# Patient Record
Sex: MALE | Race: WHITE | NOT HISPANIC OR LATINO | Employment: OTHER | URBAN - NONMETROPOLITAN AREA
[De-identification: names, ages, dates, MRNs, and addresses within clinical notes are randomized per-mention and may not be internally consistent; named-entity substitution may affect disease eponyms.]

---

## 2017-02-28 ENCOUNTER — OFFICE VISIT (OUTPATIENT)
Dept: OTOLARYNGOLOGY | Facility: CLINIC | Age: 67
End: 2017-02-28

## 2017-02-28 VITALS — WEIGHT: 166 LBS | BODY MASS INDEX: 22.48 KG/M2 | HEIGHT: 72 IN | TEMPERATURE: 97.2 F

## 2017-02-28 DIAGNOSIS — H60.63 CHRONIC NON-INFECTIVE OTITIS EXTERNA OF BOTH EARS, UNSPECIFIED TYPE: Primary | ICD-10-CM

## 2017-02-28 PROCEDURE — 99212 OFFICE O/P EST SF 10 MIN: CPT | Performed by: OTOLARYNGOLOGY

## 2017-02-28 NOTE — PROGRESS NOTES
Subjective   Kane Baldwin is a 66 y.o. male.       History of Present Illness   Patient has a significantly dilated left ear canal that causes him to collect wax and squamous debris.  He is also very sensitive with a pronounced cough reflex when his ears were cleaned.  He returns for reevaluation stating he can tell he has something in his ear particularly on the left.      The following portions of the patient's history were reviewed and updated as appropriate: allergies, current medications, past family history, past medical history, past social history, past surgical history and problem list.     reports that he has quit smoking. He has quit using smokeless tobacco. His smokeless tobacco use included Chew.   Patient is not a tobacco user and has not been counseled for use of tobacco products      Review of Systems        Objective   Physical Exam  Only modest squamous debris in the right ear.  Left ear shows significant dilation the ear canal and a good bit of squamous debris that cleaned under the microscope using cerumen loops and suction.  Tympanic membrane is intact.      Assessment/Plan   Kane was seen today for follow-up.    Diagnoses and all orders for this visit:    Chronic non-infective otitis externa of both ears, unspecified type      Plan: Left ear cleaned as described above.  Return in 3 months.  Call for problems.

## 2017-03-28 ENCOUNTER — HOSPITAL ENCOUNTER (OUTPATIENT)
Dept: GENERAL RADIOLOGY | Age: 67
Discharge: HOME | End: 2017-03-28
Payer: MEDICARE

## 2017-03-28 ENCOUNTER — OFFICE VISIT (OUTPATIENT)
Dept: UROLOGY | Age: 67
End: 2017-03-28
Payer: MEDICARE

## 2017-03-28 VITALS
TEMPERATURE: 98.2 F | BODY MASS INDEX: 22.48 KG/M2 | WEIGHT: 166 LBS | DIASTOLIC BLOOD PRESSURE: 70 MMHG | HEIGHT: 72 IN | SYSTOLIC BLOOD PRESSURE: 112 MMHG | HEART RATE: 76 BPM | OXYGEN SATURATION: 91 %

## 2017-03-28 DIAGNOSIS — N20.0 KIDNEY STONE: ICD-10-CM

## 2017-03-28 DIAGNOSIS — N20.1 RIGHT URETERAL STONE: Primary | ICD-10-CM

## 2017-03-28 DIAGNOSIS — N20.1 RIGHT URETERAL STONE: ICD-10-CM

## 2017-03-28 PROCEDURE — 4040F PNEUMOC VAC/ADMIN/RCVD: CPT | Performed by: PHYSICIAN ASSISTANT

## 2017-03-28 PROCEDURE — G8419 CALC BMI OUT NRM PARAM NOF/U: HCPCS | Performed by: PHYSICIAN ASSISTANT

## 2017-03-28 PROCEDURE — 1036F TOBACCO NON-USER: CPT | Performed by: PHYSICIAN ASSISTANT

## 2017-03-28 PROCEDURE — 1123F ACP DISCUSS/DSCN MKR DOCD: CPT | Performed by: PHYSICIAN ASSISTANT

## 2017-03-28 PROCEDURE — 3017F COLORECTAL CA SCREEN DOC REV: CPT | Performed by: PHYSICIAN ASSISTANT

## 2017-03-28 PROCEDURE — G8427 DOCREV CUR MEDS BY ELIG CLIN: HCPCS | Performed by: PHYSICIAN ASSISTANT

## 2017-03-28 PROCEDURE — 99213 OFFICE O/P EST LOW 20 MIN: CPT | Performed by: PHYSICIAN ASSISTANT

## 2017-03-28 PROCEDURE — G8484 FLU IMMUNIZE NO ADMIN: HCPCS | Performed by: PHYSICIAN ASSISTANT

## 2017-03-28 PROCEDURE — 74000 XR ABDOMEN LIMITED (KUB): CPT

## 2017-03-28 ASSESSMENT — ENCOUNTER SYMPTOMS
COUGH: 0
BLURRED VISION: 0
HEMOPTYSIS: 0
HEARTBURN: 0
DOUBLE VISION: 0
NAUSEA: 0

## 2017-05-30 ENCOUNTER — OFFICE VISIT (OUTPATIENT)
Dept: OTOLARYNGOLOGY | Facility: CLINIC | Age: 67
End: 2017-05-30

## 2017-05-30 VITALS — HEIGHT: 72 IN | WEIGHT: 161 LBS | BODY MASS INDEX: 21.81 KG/M2 | TEMPERATURE: 96 F

## 2017-05-30 DIAGNOSIS — H61.22 IMPACTED CERUMEN OF LEFT EAR: Primary | ICD-10-CM

## 2017-05-30 PROCEDURE — 69210 REMOVE IMPACTED EAR WAX UNI: CPT | Performed by: OTOLARYNGOLOGY

## 2017-08-31 ENCOUNTER — OFFICE VISIT (OUTPATIENT)
Dept: OTOLARYNGOLOGY | Facility: CLINIC | Age: 67
End: 2017-08-31

## 2017-08-31 VITALS — BODY MASS INDEX: 21.4 KG/M2 | OXYGEN SATURATION: 96 % | WEIGHT: 158 LBS | HEIGHT: 72 IN

## 2017-08-31 DIAGNOSIS — H61.22 IMPACTED CERUMEN OF LEFT EAR: Primary | ICD-10-CM

## 2017-08-31 PROCEDURE — 69210 REMOVE IMPACTED EAR WAX UNI: CPT | Performed by: OTOLARYNGOLOGY

## 2017-11-30 ENCOUNTER — OFFICE VISIT (OUTPATIENT)
Dept: OTOLARYNGOLOGY | Facility: CLINIC | Age: 67
End: 2017-11-30

## 2017-11-30 VITALS — OXYGEN SATURATION: 98 % | BODY MASS INDEX: 21.94 KG/M2 | HEART RATE: 61 BPM | WEIGHT: 162 LBS | HEIGHT: 72 IN

## 2017-11-30 DIAGNOSIS — H61.22 IMPACTED CERUMEN OF LEFT EAR: Primary | ICD-10-CM

## 2017-11-30 PROCEDURE — 69210 REMOVE IMPACTED EAR WAX UNI: CPT | Performed by: OTOLARYNGOLOGY

## 2017-11-30 NOTE — PROGRESS NOTES
Subjective   Kane Baldwin is a 67 y.o. male.       History of Present Illness   Patient has a dilated left ear canal and is prone to cerumen impactions.  Has a cough reflex that is severe when his ear is being manipulated.  In the past this has required cleaning of his ear under general anesthesia however lately he's been able to tolerate office debridement.  States his ear feels stopped up again.      The following portions of the patient's history were reviewed and updated as appropriate: allergies, current medications, past family history, past medical history, past social history, past surgical history and problem list.     reports that he has quit smoking. His smokeless tobacco use includes Chew.   Patient is a tobacco user and has been counseled for use of tobacco products      Review of Systems        Objective   Physical Exam  Right ear canal shows minimal squamous debris.  Tympanic membrane intact and clear.  Left ear canal is completely filled with cerumen and dried blood  Using the binocular microscope for visualization, cerumen impaction was removed from left ear canal(s) using instrumentation. This was personally performed by Hardeep Hnedrix MD  After cerumen removal tympanic membrane is noted be intact with no infection or effusion    Assessment/Plan   Kane was seen today for follow-up.    Diagnoses and all orders for this visit:    Impacted cerumen of left ear        Plan: Cerumen removed as described above.  Return in 3 months.

## 2018-03-01 ENCOUNTER — OFFICE VISIT (OUTPATIENT)
Dept: OTOLARYNGOLOGY | Facility: CLINIC | Age: 68
End: 2018-03-01

## 2018-03-01 VITALS — HEART RATE: 70 BPM | HEIGHT: 72 IN | WEIGHT: 169.6 LBS | BODY MASS INDEX: 22.97 KG/M2 | OXYGEN SATURATION: 98 %

## 2018-03-01 DIAGNOSIS — H60.63 CHRONIC NON-INFECTIVE OTITIS EXTERNA OF BOTH EARS, UNSPECIFIED TYPE: Primary | ICD-10-CM

## 2018-03-01 PROCEDURE — 99213 OFFICE O/P EST LOW 20 MIN: CPT | Performed by: OTOLARYNGOLOGY

## 2018-03-01 NOTE — PROGRESS NOTES
Subjective   Kane Baldwin is a 67 y.o. male.       History of Present Illness   Patient has recurring cerumen impactions and chronic otitis externa.  Has an extremely sensitive cough reflex with ear cleaning and has dilation of the left ear canal and is previously required cleaning of his ears under general anesthesia.  Returns today for reevaluation stating he thinks his ears do need cleaning      The following portions of the patient's history were reviewed and updated as appropriate: allergies, current medications, past family history, past medical history, past social history, past surgical history and problem list.     reports that he has quit smoking. His smokeless tobacco use includes Chew.   Patient is a tobacco user and has been counseled for use of tobacco products      Review of Systems   Constitutional: Negative for fever.           Objective   Physical Exam ears: External ears no deformity.  Right ear canal shows noninfected squamous debris that cleaned under the microscope.  Tympanic membrane intact and clear.  Left ear canal shows dilated ear canal with a large amount of squamous debris and hair.  This is all cleaned under the microscope.  Tympanic membrane is intact no infection or effusion    Assessment/Plan   Kane was seen today for follow-up.    Diagnoses and all orders for this visit:    Chronic non-infective otitis externa of both ears, unspecified type        Plan: Ears cleaned as described above.  Return in 3 months.  Call sooner for problems.

## 2018-06-07 ENCOUNTER — OFFICE VISIT (OUTPATIENT)
Dept: OTOLARYNGOLOGY | Facility: CLINIC | Age: 68
End: 2018-06-07

## 2018-06-07 VITALS — BODY MASS INDEX: 23.24 KG/M2 | WEIGHT: 171.6 LBS | OXYGEN SATURATION: 94 % | HEIGHT: 72 IN

## 2018-06-07 DIAGNOSIS — H60.63 CHRONIC NON-INFECTIVE OTITIS EXTERNA OF BOTH EARS, UNSPECIFIED TYPE: Primary | ICD-10-CM

## 2018-06-07 PROCEDURE — 99212 OFFICE O/P EST SF 10 MIN: CPT | Performed by: OTOLARYNGOLOGY

## 2018-06-07 NOTE — PROGRESS NOTES
Subjective   Kane Baldwin is a 68 y.o. male.       History of Present Illness   Patient has a history of a dilated left ear canal and extremely sensitive ear producing substantial coughing anytime is ears cleaned.  Previously required cleaning under general anesthesia.  Returns today for reevaluation stating his ear is not draining.      The following portions of the patient's history were reviewed and updated as appropriate: allergies, current medications, past family history, past medical history, past social history, past surgical history and problem list.     reports that he has quit smoking. His smokeless tobacco use includes Chew.   Patient is not a tobacco user and has not been counseled for use of tobacco products      Review of Systems        Objective   Physical Exam  Right ear canal shows only modest squamous debris and is not cleaned today.  Tympanic membrane is intact.  Left ear canal shows quite a bit of squamous debris that is uninfected.  This is cleaned under the microscope to the limits of patient tolerance, as usual, causing substantial coughing.  Tympanic membrane is intact.      Assessment/Plan   Kane was seen today for follow-up.    Diagnoses and all orders for this visit:    Chronic non-infective otitis externa of both ears, unspecified type        Plan: Ears cleaned as described above.  Return in 3 months.  Call for problems.

## 2018-09-11 ENCOUNTER — OFFICE VISIT (OUTPATIENT)
Dept: OTOLARYNGOLOGY | Facility: CLINIC | Age: 68
End: 2018-09-11

## 2018-09-11 VITALS — WEIGHT: 171.2 LBS | HEIGHT: 72 IN | RESPIRATION RATE: 17 BRPM | BODY MASS INDEX: 23.19 KG/M2

## 2018-09-11 DIAGNOSIS — H60.63 CHRONIC NON-INFECTIVE OTITIS EXTERNA OF BOTH EARS, UNSPECIFIED TYPE: Primary | ICD-10-CM

## 2018-09-11 PROCEDURE — 99212 OFFICE O/P EST SF 10 MIN: CPT | Performed by: OTOLARYNGOLOGY

## 2018-09-11 NOTE — PROGRESS NOTES
Subjective   Kane Baldwin is a 68 y.o. male.       History of Present Illness   Patient is followed with chronic otitis externa.  Has a dilated ear canal on the left.  Is extremely sensitive with vigorous cough reflex with ear cleaning.  Returns today stating he is not having any otorrhea.      The following portions of the patient's history were reviewed and updated as appropriate: allergies, current medications, past family history, past medical history, past social history, past surgical history and problem list.     reports that he has quit smoking. His smokeless tobacco use includes Chew.   Patient is a tobacco user and has been counseled for use of tobacco products      Review of Systems        Objective   Physical Exam  Ears: External ears no deformity.  Both ear canals show noninfected squamous debris that is cleaned under the microscope using instrumentation.  Tympanic membranes are intact no infection or effusion      Assessment/Plan   Kane was seen today for follow-up.    Diagnoses and all orders for this visit:    Chronic non-infective otitis externa of both ears, unspecified type      Plan: Ears cleaned as described above.  Return in 3 months.

## 2018-12-11 ENCOUNTER — OFFICE VISIT (OUTPATIENT)
Dept: OTOLARYNGOLOGY | Facility: CLINIC | Age: 68
End: 2018-12-11

## 2018-12-11 VITALS — HEIGHT: 72 IN | OXYGEN SATURATION: 98 % | WEIGHT: 173.4 LBS | BODY MASS INDEX: 23.49 KG/M2

## 2018-12-11 DIAGNOSIS — H60.63 CHRONIC NON-INFECTIVE OTITIS EXTERNA OF BOTH EARS, UNSPECIFIED TYPE: Primary | ICD-10-CM

## 2018-12-11 PROCEDURE — 99212 OFFICE O/P EST SF 10 MIN: CPT | Performed by: OTOLARYNGOLOGY

## 2018-12-14 NOTE — PROGRESS NOTES
Subjective   Kane Baldwin is a 68 y.o. male.       History of Present Illness   Patient has a dilated left ear canal that he previously had a rather massive cerumen impaction.  Undergoes periodic ear cleanings to prevent reaccumulation to the extent that he previously had.  Has a vigorous cough reflex on stimulation of the ear.  Reports he's had no otorrhea since I saw him last.      The following portions of the patient's history were reviewed and updated as appropriate: allergies, current medications, past family history, past medical history, past social history, past surgical history and problem list.     reports that he has quit smoking. His smokeless tobacco use includes chew.   Patient is a tobacco user and has been counseled for use of tobacco products      Review of Systems        Objective   Physical Exam  Right ear canal shows wax and squamous debris that is nonobstructing but is cleaned under the microscope using instrumentation.  Tympanic membrane is intact.  Left ear canal shows dilated ear canal and a large amount of uninfected wax and squamous debris that cleaned under the microscope using instrumentation.  Tympanic membrane appears intact.      Assessment/Plan   Kane was seen today for follow-up.    Diagnoses and all orders for this visit:    Chronic non-infective otitis externa of both ears, unspecified type        Plan: Ears cleaned as described above.  Return in 3 months.

## 2019-03-12 ENCOUNTER — OFFICE VISIT (OUTPATIENT)
Dept: OTOLARYNGOLOGY | Facility: CLINIC | Age: 69
End: 2019-03-12

## 2019-03-12 VITALS — BODY MASS INDEX: 22.75 KG/M2 | HEIGHT: 72 IN | RESPIRATION RATE: 18 BRPM | WEIGHT: 168 LBS

## 2019-03-12 DIAGNOSIS — H60.63 CHRONIC NON-INFECTIVE OTITIS EXTERNA OF BOTH EARS, UNSPECIFIED TYPE: Primary | ICD-10-CM

## 2019-03-12 PROCEDURE — 99212 OFFICE O/P EST SF 10 MIN: CPT | Performed by: OTOLARYNGOLOGY

## 2019-03-12 NOTE — PROGRESS NOTES
Subjective   Kane Baldwin is a 68 y.o. male.       History of Present Illness     Patient is followed with a dilated left ear canal, chronic otitis externa, and a very vigorous cough reflex when his ear is manipulated.  Reports since I saw him last he has not had any otorrhea but he can tell his ears need cleaning    The following portions of the patient's history were reviewed and updated as appropriate: allergies, current medications, past family history, past medical history, past social history, past surgical history and problem list.     reports that he has quit smoking. His smokeless tobacco use includes chew.   Patient is a tobacco user and has been counseled for use of tobacco products      Review of Systems        Objective   Physical Exam  Both ear canals show a good bit of uninfected squamous debris that is cleaned under the microscope using instrumentation.  Beyond this tympanic membranes are intact with no infection or effusion    Assessment/Plan   Kane was seen today for follow-up.    Diagnoses and all orders for this visit:    Chronic non-infective otitis externa of both ears, unspecified type          Plan: Ears cleaned as described above.  Return in 3 months.

## 2019-06-11 ENCOUNTER — OFFICE VISIT (OUTPATIENT)
Dept: OTOLARYNGOLOGY | Facility: CLINIC | Age: 69
End: 2019-06-11

## 2019-06-11 VITALS — OXYGEN SATURATION: 98 % | HEIGHT: 72 IN | BODY MASS INDEX: 22.59 KG/M2 | HEART RATE: 66 BPM | WEIGHT: 166.8 LBS

## 2019-06-11 DIAGNOSIS — H60.63 CHRONIC NON-INFECTIVE OTITIS EXTERNA OF BOTH EARS, UNSPECIFIED TYPE: Primary | ICD-10-CM

## 2019-06-11 PROCEDURE — 99212 OFFICE O/P EST SF 10 MIN: CPT | Performed by: OTOLARYNGOLOGY

## 2019-06-11 NOTE — PROGRESS NOTES
Subjective   Kane Baldwin is a 69 y.o. male.       History of Present Illness   Patient has a history of chronic noninfective otitis externa with a very vigorous cough reflex and a dilated left ear canal.  States he is not having any otorrhea.      The following portions of the patient's history were reviewed and updated as appropriate: allergies, current medications, past family history, past medical history, past social history, past surgical history and problem list.     reports that he has quit smoking. His smokeless tobacco use includes chew.   Patient is a tobacco user and has been counseled for use of tobacco products      Review of Systems        Objective   Physical Exam  Left ear shows dilated canal with uninfected squamous debris that is cleaned under the microscope.  Vigorous cough reflex is present as always.  Tympanic membrane is intact and clear.  Right ear canal shows modest squamous debris that is cleaned under the microscope using instrumentation.  Tympanic membrane is intact and clear.    Assessment/Plan   Kane was seen today for follow-up.    Diagnoses and all orders for this visit:    Chronic non-infective otitis externa of both ears, unspecified type          Cleaned as described above.  Return in 3 months.

## 2019-09-17 ENCOUNTER — OFFICE VISIT (OUTPATIENT)
Dept: OTOLARYNGOLOGY | Facility: CLINIC | Age: 69
End: 2019-09-17

## 2019-09-17 VITALS — WEIGHT: 159.4 LBS | BODY MASS INDEX: 21.59 KG/M2 | HEIGHT: 72 IN | OXYGEN SATURATION: 99 %

## 2019-09-17 DIAGNOSIS — H60.63 CHRONIC NON-INFECTIVE OTITIS EXTERNA OF BOTH EARS, UNSPECIFIED TYPE: Primary | ICD-10-CM

## 2019-09-17 PROCEDURE — 99212 OFFICE O/P EST SF 10 MIN: CPT | Performed by: OTOLARYNGOLOGY

## 2019-09-17 NOTE — PROGRESS NOTES
Subjective   Kane Baldwin is a 69 y.o. male.       History of Present Illness   Patient is followed with chronic noninfective otitis externa.  Has a dilated left ear canal and a very vigorous cough reflex.  Reports he has not had any otorrhea or otalgia.      The following portions of the patient's history were reviewed and updated as appropriate: allergies, current medications, past family history, past medical history, past social history, past surgical history and problem list.     reports that he has quit smoking. His smokeless tobacco use includes chew.   Patient is a tobacco user and has been counseled for use of tobacco products      Review of Systems        Objective   Physical Exam  Both ear canals show uninfected squamous debris that is cleaned under the microscope using instrumentation.  The left ear canal is dilated.  No granulation tissue or purulence.  Vigorous cough reflex particularly on the left.  Both tympanic membranes are intact and clear.      Assessment/Plan   Kane was seen today for follow-up.    Diagnoses and all orders for this visit:    Chronic non-infective otitis externa of both ears, unspecified type        Plan: Ears cleaned as described above.  Return in 3 months.  Call for problems.

## 2019-12-31 ENCOUNTER — OFFICE VISIT (OUTPATIENT)
Dept: OTOLARYNGOLOGY | Facility: CLINIC | Age: 69
End: 2019-12-31

## 2019-12-31 VITALS — WEIGHT: 156.8 LBS | BODY MASS INDEX: 21.24 KG/M2 | HEIGHT: 72 IN | TEMPERATURE: 97.4 F

## 2019-12-31 DIAGNOSIS — H60.63 CHRONIC NON-INFECTIVE OTITIS EXTERNA OF BOTH EARS, UNSPECIFIED TYPE: Primary | ICD-10-CM

## 2019-12-31 PROCEDURE — 99212 OFFICE O/P EST SF 10 MIN: CPT | Performed by: OTOLARYNGOLOGY

## 2020-01-03 NOTE — PROGRESS NOTES
Subjective   Kane Baldwin is a 69 y.o. male.       History of Present Illness   Patient has chronic noninfective otitis externa as well as dilation of the left ear canal.  Has an extremely vigorous cough reflex.  Says he can tell is ears are needing to be cleaned.      The following portions of the patient's history were reviewed and updated as appropriate: allergies, current medications, past family history, past medical history, past social history, past surgical history and problem list.     reports that he has quit smoking. His smokeless tobacco use includes chew.   Patient is a tobacco user and has been counseled for use of tobacco products      Review of Systems        Objective   Physical Exam  Ears: Both ear canals show quite a bit of squamous debris today.  This is cleaned under the microscope using instrumentation.  Patient's vigorous cough reflex remains present.  No granulation tissue or purulence.  Both tympanic membranes are intact.      Assessment/Plan   Kane was seen today for follow-up.    Diagnoses and all orders for this visit:    Chronic non-infective otitis externa of both ears, unspecified type      pLan: Ears cleaned as described above.  Return in 3 months.

## 2020-01-07 ENCOUNTER — OFFICE VISIT (OUTPATIENT)
Dept: PULMONOLOGY | Facility: CLINIC | Age: 70
End: 2020-01-07

## 2020-01-07 VITALS
BODY MASS INDEX: 21.76 KG/M2 | HEIGHT: 70 IN | DIASTOLIC BLOOD PRESSURE: 70 MMHG | HEART RATE: 77 BPM | SYSTOLIC BLOOD PRESSURE: 118 MMHG | WEIGHT: 152 LBS | OXYGEN SATURATION: 97 %

## 2020-01-07 DIAGNOSIS — R06.02 SHORTNESS OF BREATH: Primary | ICD-10-CM

## 2020-01-07 DIAGNOSIS — J44.9 STAGE 3 SEVERE COPD BY GOLD CLASSIFICATION (HCC): ICD-10-CM

## 2020-01-07 DIAGNOSIS — J90 RECURRENT PLEURAL EFFUSION ON LEFT: ICD-10-CM

## 2020-01-07 PROCEDURE — 94010 BREATHING CAPACITY TEST: CPT | Performed by: INTERNAL MEDICINE

## 2020-01-07 PROCEDURE — 94729 DIFFUSING CAPACITY: CPT | Performed by: INTERNAL MEDICINE

## 2020-01-07 PROCEDURE — 94727 GAS DIL/WSHOT DETER LNG VOL: CPT | Performed by: INTERNAL MEDICINE

## 2020-01-07 PROCEDURE — 99204 OFFICE O/P NEW MOD 45 MIN: CPT | Performed by: INTERNAL MEDICINE

## 2020-01-07 NOTE — PROCEDURES
Pulmonary Function Test  Performed by: Percy Nguyen MD  Authorized by: Percy Nguyen MD      Pre Drug    FVC: 54%   FEV1: 40%   FEF 25-75%: 20%   FEV1/FVC: 57.96%   T%   RV: 104%   DLCO: 64%   D/VAsb: 111%

## 2020-01-07 NOTE — PROGRESS NOTES
Subjective   Kane Baldwin is a 69 y.o. male.     Background: Pt with hx lung nodule 8mm LLL stable on serial ct x 2 years [id number].  , borderline pet scan 2018 not in database, new pleural eff[facility] on left 2019 [facility]    Chief Complaint   Patient presents with   • Shortness of Breath   • Pleural Effusion        Shortness of Breath   This is a recurrent problem. The current episode started more than 1 month ago. The problem occurs every several days. The problem has been waxing and waning. Associated symptoms include chest pain, a fever, orthopnea, PND and sputum production. Pertinent negatives include no abdominal pain, headaches, hemoptysis, leg pain, leg swelling, neck pain, rash, rhinorrhea, sore throat, swollen glands, syncope or vomiting. The symptoms are aggravated by exercise.      About 7-8 years ago he had a right pleural procedure with Dr. Silva.  I had followed a nodule on the left for 2 years up till 2015.  Now he has had intermittent mild to severe shortness of breath in chest on exertion alleviated by rest, associated with fever off and on for about a month.  He has chronic intermittent dyspnea on exertion associated with wheezing which is better with spiriva and albuterol mdi/neb.  He says he is much better overall today than he has been a week ago.  Roland Paulino has asked me to see him to evaluate.    Medical/Family/Social History   has a past medical history of heart artery stent.   has a past surgical history that includes Cataract extraction w/ intraocular lens implant (Right).  family history includes Cancer in his mother; No Known Problems in his father.   reports that he has quit smoking. His smoking use included cigarettes. He has a 10.00 pack-year smoking history. His smokeless tobacco use includes chew. He reports that he does not drink alcohol or use drugs.  No Known Allergies  Medications    Current Outpatient Medications:   •  aspirin 81 MG tablet, Take 81 mg by mouth  "daily., Disp: , Rfl:   •  atorvastatin (LIPITOR) 10 MG tablet, Take 10 mg by mouth daily., Disp: , Rfl:   •  Levalbuterol HCl (XOPENEX IN), Inhale 1 packet 3 (three) times a day., Disp: , Rfl:   •  metoprolol tartrate (LOPRESSOR) 25 MG tablet, Take 25 mg by mouth 2 (two) times a day., Disp: , Rfl:   •  tiotropium (SPIRIVA HANDIHALER) 18 MCG per inhalation capsule, Place 1 capsule into inhaler and inhale 1 (one) time daily., Disp: , Rfl:     Review of Systems   Constitutional: Positive for fever. Negative for chills.   HENT: Negative for rhinorrhea, sore throat, swollen glands, trouble swallowing and voice change.    Eyes: Negative for photophobia and visual disturbance.   Respiratory: Positive for sputum production and shortness of breath. Negative for hemoptysis.    Cardiovascular: Positive for chest pain, orthopnea and PND. Negative for leg swelling and syncope.   Gastrointestinal: Negative for abdominal pain, nausea, vomiting and GERD.   Genitourinary: Negative for difficulty urinating and hematuria.   Musculoskeletal: Negative for gait problem, joint swelling and neck pain.   Skin: Negative for pallor, rash and skin lesions.   Neurological: Negative for tremors, weakness and confusion.   Hematological: Negative for adenopathy. Does not bruise/bleed easily.   Psychiatric/Behavioral: The patient is not nervous/anxious.      Objective   /70   Pulse 77   Ht 177.8 cm (70\")   Wt 68.9 kg (152 lb)   SpO2 97% Comment: RA  BMI 21.81 kg/m²   Physical Exam   Constitutional: He appears well-developed and well-nourished. He does not appear ill. No distress.   HENT:   Head: Normocephalic and atraumatic.   Nose: Nose normal.   Eyes: Conjunctivae and EOM are normal.   Neck: Neck supple.   Cardiovascular: Normal rate, regular rhythm, S1 normal and S2 normal.   Pulmonary/Chest: Effort normal. He has no wheezes. He has no rales.   Abdominal: Soft. He exhibits no distension. There is no tenderness. There is no guarding. "   Musculoskeletal: He exhibits no deformity.   Lymphadenopathy:     He has no cervical adenopathy.   Neurological: He is alert.   Skin: Skin is warm and dry. No rash noted.   Psychiatric: He has a normal mood and affect.     -----------------------------------------------------------------------------------------------  CT chest cuts of ct abd homero santacruz, my interpretation: large left pleural effusion     -----------------------------------------------------------------------------------------------  PFT Values        Some values may be hidden. Unless noted otherwise, only the newest values recorded on each date are displayed.         Old Values PFT Results 1/7/20   No data to display.      Pre Drug PFT Results 1/7/20   FVC 54   FEV1 40   FEF 25-75% 20   FEV1/FVC 57.96      Post Drug PFT Results 1/7/20   No data to display.      Other Tests PFT Results 1/7/20   TLC 75      DLCO 64   D/VAsb 111           My interpretation of the PFT: severe airflow obstruction normal diffusion capacity and reduced TLC suggesting coexisting restriction    -----------------------------------------------------------------------------------------------  Assessment/Plan   Problem List Items Addressed This Visit        Pulmonary Problems    Recurrent pleural effusion on left    Relevant Orders    XR Chest 2 View    Stage 3 severe COPD by GOLD classification (CMS/formerly [facility])    Shortness of breath - Primary    Relevant Orders    Pulmonary Function Test (Completed)    XR Chest 2 View        Patient's Body mass index is 21.81 kg/m². BMI is within normal parameters. No follow-up required..      He has severe copd based on spirometry above, with appropriate medical therapy  He is having waxing and waning symptoms similar to those he had on the right side previously  Shortness of breath is due to both of the above  Physical exam does not correlated with radiograph findings  Will repeat plain cxr at this point.  If fluid persists then do  thoracentesis  Will call with cxr result and arrange above over phone if indicated  We gave copd educational material  Continue spiriva and albuterol/levalbuterol; can change spiriva to stiolto if symptoms worsen  Will need to hold asa if thoracentesis ends up being done.       Electronically signed by Percy Nguyen MD, 1/7/2020, 3:26 PM

## 2020-01-09 DIAGNOSIS — R06.02 SHORTNESS OF BREATH: ICD-10-CM

## 2020-01-09 DIAGNOSIS — J90 RECURRENT PLEURAL EFFUSION ON LEFT: ICD-10-CM

## 2020-01-10 NOTE — PROGRESS NOTES
Let pt know this looked ok. We cant see the films but the report sounds like no thoracentesis would be needed.  Nothing else to do for now.  Keep follow up as planned

## 2020-05-12 ENCOUNTER — OFFICE VISIT (OUTPATIENT)
Dept: OTOLARYNGOLOGY | Facility: CLINIC | Age: 70
End: 2020-05-12

## 2020-05-12 VITALS — WEIGHT: 156.8 LBS | HEIGHT: 72 IN | BODY MASS INDEX: 21.24 KG/M2 | OXYGEN SATURATION: 98 %

## 2020-05-12 DIAGNOSIS — H60.63 CHRONIC NON-INFECTIVE OTITIS EXTERNA OF BOTH EARS, UNSPECIFIED TYPE: Primary | ICD-10-CM

## 2020-05-12 PROCEDURE — 99212 OFFICE O/P EST SF 10 MIN: CPT | Performed by: OTOLARYNGOLOGY

## 2020-05-12 NOTE — PROGRESS NOTES
Subjective   Kane Baldwin is a 70 y.o. male.       History of Present Illness   Patient is followed with chronic noninfective otitis externa.  He has a very vigorous cough reflex from his usual clean.  His regularly scheduled visit was postponed due to the COVID epidemic.  He states he can tell is ears need cleaning but they have not been draining      The following portions of the patient's history were reviewed and updated as appropriate: allergies, current medications, past family history, past medical history, past social history, past surgical history and problem list.     reports that he has quit smoking. His smoking use included cigarettes. He has a 10.00 pack-year smoking history. His smokeless tobacco use includes chew. He reports that he does not drink alcohol or use drugs.   Patient is a tobacco user and has been counseled for use of tobacco products      Review of Systems        Objective   Physical Exam  Ears: External ears no deformity.  Both ear canals show uninfected squamous debris that is cleaned under the microscope using instrumentation.  Tympanic membranes are intact with no infection or effusion.  There is some modest bleeding on the left and so Ciprodex is instilled.      Assessment/Plan   Kane was seen today for follow-up.    Diagnoses and all orders for this visit:    Chronic non-infective otitis externa of both ears, unspecified type        Plan: Ears cleaned as described above.  Return 4 months.  Call for problems.

## 2020-09-10 ENCOUNTER — OFFICE VISIT (OUTPATIENT)
Dept: OTOLARYNGOLOGY | Facility: CLINIC | Age: 70
End: 2020-09-10

## 2020-09-10 VITALS — TEMPERATURE: 98.4 F | OXYGEN SATURATION: 96 % | WEIGHT: 154.4 LBS | BODY MASS INDEX: 20.91 KG/M2 | HEIGHT: 72 IN

## 2020-09-10 DIAGNOSIS — H60.63 CHRONIC NON-INFECTIVE OTITIS EXTERNA OF BOTH EARS, UNSPECIFIED TYPE: Primary | ICD-10-CM

## 2020-09-10 PROCEDURE — 99212 OFFICE O/P EST SF 10 MIN: CPT | Performed by: OTOLARYNGOLOGY

## 2020-09-10 NOTE — PROGRESS NOTES
Subjective   Kane Baldwin is a 70 y.o. male.       History of Present Illness   Patient has longstanding trouble with chronic noninfective otitis externa.  Has a very vigorous cough reflex and a significantly dilated left ear canal.  Reports she is not having any otorrhea.  Says he can tell his ears likely need cleaning particularly on the left.      The following portions of the patient's history were reviewed and updated as appropriate: allergies, current medications, past family history, past medical history, past social history, past surgical history and problem list.     reports that he has quit smoking. His smoking use included cigarettes. He has a 10.00 pack-year smoking history. His smokeless tobacco use includes chew. He reports that he does not drink alcohol or use drugs.   Patient is not a tobacco user and has not been counseled for use of tobacco products      Review of Systems        Objective   Physical Exam  Both ear canals show a large amount of uninfected squamous debris more so on the left than the right.  This is cleaned under the microscope using instrumentation to the limits of patient tolerance with his usual vigorous cough reflex.  Tympanic membranes are intact with no evidence of infection.      Assessment/Plan   Kane was seen today for follow-up.    Diagnoses and all orders for this visit:    Chronic non-infective otitis externa of both ears, unspecified type        Plan: Ears cleaned as described above.  Return 4 months.  Call for problems.

## 2021-01-19 ENCOUNTER — OFFICE VISIT (OUTPATIENT)
Dept: OTOLARYNGOLOGY | Facility: CLINIC | Age: 71
End: 2021-01-19

## 2021-01-19 VITALS — HEIGHT: 72 IN | BODY MASS INDEX: 21.73 KG/M2 | OXYGEN SATURATION: 100 % | WEIGHT: 160.4 LBS

## 2021-01-19 DIAGNOSIS — H60.63 CHRONIC NON-INFECTIVE OTITIS EXTERNA OF BOTH EARS, UNSPECIFIED TYPE: Primary | ICD-10-CM

## 2021-01-19 PROCEDURE — 99213 OFFICE O/P EST LOW 20 MIN: CPT | Performed by: OTOLARYNGOLOGY

## 2021-01-19 NOTE — PROGRESS NOTES
Subjective   Kane Baldwin is a 70 y.o. male.       History of Present Illness     Patient has bilateral dilated ear canals and chronic noninfective otitis externa with an extremely vigorous cough reflex with stimulation of his ears.  States that he is due to have new hearing aids fitted by the [employer] but needed his ears cleaned before they would do his hearing test.    The following portions of the patient's history were reviewed and updated as appropriate: allergies, current medications, past family history, past medical history, past social history, past surgical history and problem list.     reports that he has quit smoking. His smoking use included cigarettes. He has a 10.00 pack-year smoking history. His smokeless tobacco use includes chew. He reports that he does not drink alcohol or use drugs.   Patient is not a tobacco user and has not been counseled for use of tobacco products      Review of Systems        Objective   Physical Exam  Ears: External ears no deformity.  Both ear canals show a large amount of uninfected squamous debris that is able to be cleaned under the microscope using instrumentation.  Vigorous cough reflex is present.  Tympanic membranes are intact with no infection or effusion.  Because there was some irritation of the canal skin bilaterally Ciprodex was instilled in both ears.      Assessment/Plan   Diagnoses and all orders for this visit:    1. Chronic non-infective otitis externa of both ears, unspecified type (Primary)        Plan: Ears cleaned as described above.  Advise return 4 months.  Told him he could proceed with new hearing aid fitting.

## 2021-05-25 ENCOUNTER — OFFICE VISIT (OUTPATIENT)
Dept: OTOLARYNGOLOGY | Facility: CLINIC | Age: 71
End: 2021-05-25

## 2021-05-25 VITALS — BODY MASS INDEX: 20.48 KG/M2 | WEIGHT: 151.2 LBS | TEMPERATURE: 97.9 F | HEIGHT: 72 IN | OXYGEN SATURATION: 98 %

## 2021-05-25 DIAGNOSIS — H60.63 CHRONIC NON-INFECTIVE OTITIS EXTERNA OF BOTH EARS, UNSPECIFIED TYPE: Primary | ICD-10-CM

## 2021-05-25 PROCEDURE — 99213 OFFICE O/P EST LOW 20 MIN: CPT | Performed by: OTOLARYNGOLOGY

## 2021-05-25 NOTE — PROGRESS NOTES
"Subjective   Kane Baldwin is a 71 y.o. male.       History of Present Illness   Patient has chronic noninfective otitis externa.  Has an extreme cough reflex when his ears are cleaned.  Reports he has had no otorrhea.  Says he had \"lung surgery\" 2 weeks ago.      The following portions of the patient's history were reviewed and updated as appropriate: allergies, current medications, past family history, past medical history, past social history, past surgical history and problem list.     reports that he has quit smoking. His smoking use included cigarettes. He has a 10.00 pack-year smoking history. His smokeless tobacco use includes chew. He reports that he does not drink alcohol and does not use drugs.   Patient is not a tobacco user and has not been counseled for use of tobacco products      Review of Systems        Objective   Physical Exam  Both ear canals show uninfected squamous debris that is cleaned under the microscope using instrumentation.  Both ear canals are dilated more so than left than the right.  No evidence of acute infection.  Patient's cough reflex is not as bad as it was previously.      Assessment/Plan   Diagnoses and all orders for this visit:    1. Chronic non-infective otitis externa of both ears, unspecified type (Primary)      Plan: Ears cleaned as described above.  Advise return 4 months.  Call for problems.      "

## 2021-09-17 ENCOUNTER — TELEPHONE (OUTPATIENT)
Dept: CARDIOLOGY | Facility: CLINIC | Age: 71
End: 2021-09-17

## 2021-09-17 NOTE — TELEPHONE ENCOUNTER
Prema Short NP, called stating pt needs a cath.  There is no order, and there are no records in the chart.

## 2021-09-18 DIAGNOSIS — R07.9 CHEST PAIN IN ADULT: Primary | ICD-10-CM

## 2021-09-18 RX ORDER — SODIUM CHLORIDE 0.9 % (FLUSH) 0.9 %
3 SYRINGE (ML) INJECTION EVERY 12 HOURS SCHEDULED
Status: CANCELLED | OUTPATIENT
Start: 2021-09-18

## 2021-09-18 RX ORDER — SODIUM CHLORIDE 0.9 % (FLUSH) 0.9 %
10 SYRINGE (ML) INJECTION AS NEEDED
Status: CANCELLED | OUTPATIENT
Start: 2021-09-18

## 2021-09-18 RX ORDER — SODIUM CHLORIDE 9 MG/ML
75 INJECTION, SOLUTION INTRAVENOUS CONTINUOUS
Status: CANCELLED | OUTPATIENT
Start: 2021-09-18

## 2021-09-22 PROBLEM — R07.9 CHEST PAIN IN ADULT: Status: ACTIVE | Noted: 2021-09-22

## 2021-09-28 ENCOUNTER — OFFICE VISIT (OUTPATIENT)
Dept: OTOLARYNGOLOGY | Facility: CLINIC | Age: 71
End: 2021-09-28

## 2021-09-28 VITALS — BODY MASS INDEX: 19.5 KG/M2 | TEMPERATURE: 97.6 F | WEIGHT: 144 LBS | HEIGHT: 72 IN | OXYGEN SATURATION: 97 %

## 2021-09-28 DIAGNOSIS — H60.63 CHRONIC NON-INFECTIVE OTITIS EXTERNA OF BOTH EARS, UNSPECIFIED TYPE: Primary | ICD-10-CM

## 2021-09-28 PROCEDURE — 99213 OFFICE O/P EST LOW 20 MIN: CPT | Performed by: OTOLARYNGOLOGY

## 2021-09-28 NOTE — PROGRESS NOTES
Subjective   Kane Baldwin is a 71 y.o. male.       History of Present Illness   Patient is followed with chronic noninfective otitis externa.  Has an extremely vigorous cough reflex when his ears are cleaned.  Says he has not had any otorrhea since I saw him last.      The following portions of the patient's history were reviewed and updated as appropriate: allergies, current medications, past family history, past medical history, past social history, past surgical history and problem list.     reports that he has quit smoking. His smoking use included cigarettes. He has a 10.00 pack-year smoking history. His smokeless tobacco use includes chew. He reports that he does not drink alcohol and does not use drugs.   Patient is not a tobacco user and has not been counseled for use of tobacco products      Review of Systems        Objective   Physical Exam  Both ear canals show uninfected squamous debris more so on the left than the right.  The left ear canal is substantially dilated as well.  Tympanic membrane's appear to be intact.  Both ears are cleaned under the microscope using instrumentation.  Vigorous coughing occurs.  Modest bleeding occurs in one area on the left.  This is treated with topical Ash-Synephrine on cotton which is allowed to remain for approximately 1 minute.  Upon removal there is no active bleeding and Ciprodex is instilled      Assessment/Plan   Diagnoses and all orders for this visit:    1. Chronic non-infective otitis externa of both ears, unspecified type (Primary)        Plan: Ears cleaned as described above.  Advise return 4 months.  Call for problems.

## 2021-09-30 ENCOUNTER — HOSPITAL ENCOUNTER (OUTPATIENT)
Facility: HOSPITAL | Age: 71
Discharge: HOME | End: 2021-09-30
Attending: INTERNAL MEDICINE | Admitting: INTERNAL MEDICINE

## 2021-09-30 VITALS
SYSTOLIC BLOOD PRESSURE: 123 MMHG | HEART RATE: 58 BPM | DIASTOLIC BLOOD PRESSURE: 69 MMHG | RESPIRATION RATE: 13 BRPM | OXYGEN SATURATION: 98 %

## 2021-09-30 DIAGNOSIS — R07.9 CHEST PAIN IN ADULT: ICD-10-CM

## 2021-09-30 LAB
ALBUMIN SERPL-MCNC: 3.8 G/DL (ref 3.5–5.2)
ALBUMIN/GLOB SERPL: 1.1 G/DL
ALP SERPL-CCNC: 83 U/L (ref 39–117)
ALT SERPL W P-5'-P-CCNC: <5 U/L (ref 1–41)
ANION GAP SERPL CALCULATED.3IONS-SCNC: 7 MMOL/L (ref 5–15)
AST SERPL-CCNC: 14 U/L (ref 1–40)
BASOPHILS # BLD AUTO: 0.04 10*3/MM3 (ref 0–0.2)
BASOPHILS NFR BLD AUTO: 0.7 % (ref 0–1.5)
BILIRUB SERPL-MCNC: 0.3 MG/DL (ref 0–1.2)
BUN SERPL-MCNC: 17 MG/DL (ref 8–23)
BUN/CREAT SERPL: 19.3 (ref 7–25)
CALCIUM SPEC-SCNC: 8.8 MG/DL (ref 8.6–10.5)
CHLORIDE SERPL-SCNC: 100 MMOL/L (ref 98–107)
CO2 SERPL-SCNC: 29 MMOL/L (ref 22–29)
CREAT SERPL-MCNC: 0.88 MG/DL (ref 0.76–1.27)
DEPRECATED RDW RBC AUTO: 43.8 FL (ref 37–54)
EOSINOPHIL # BLD AUTO: 0.13 10*3/MM3 (ref 0–0.4)
EOSINOPHIL NFR BLD AUTO: 2.3 % (ref 0.3–6.2)
ERYTHROCYTE [DISTWIDTH] IN BLOOD BY AUTOMATED COUNT: 15.5 % (ref 12.3–15.4)
GFR SERPL CREATININE-BSD FRML MDRD: 85 ML/MIN/1.73
GLOBULIN UR ELPH-MCNC: 3.4 GM/DL
GLUCOSE SERPL-MCNC: 104 MG/DL (ref 65–99)
HCT VFR BLD AUTO: 33.5 % (ref 37.5–51)
HGB BLD-MCNC: 9.9 G/DL (ref 13–17.7)
IMM GRANULOCYTES # BLD AUTO: 0.02 10*3/MM3 (ref 0–0.05)
IMM GRANULOCYTES NFR BLD AUTO: 0.4 % (ref 0–0.5)
INR PPP: 1.04 (ref 0.91–1.09)
LYMPHOCYTES # BLD AUTO: 0.84 10*3/MM3 (ref 0.7–3.1)
LYMPHOCYTES NFR BLD AUTO: 15.2 % (ref 19.6–45.3)
MCH RBC QN AUTO: 23 PG (ref 26.6–33)
MCHC RBC AUTO-ENTMCNC: 29.6 G/DL (ref 31.5–35.7)
MCV RBC AUTO: 77.9 FL (ref 79–97)
MONOCYTES # BLD AUTO: 0.69 10*3/MM3 (ref 0.1–0.9)
MONOCYTES NFR BLD AUTO: 12.5 % (ref 5–12)
NEUTROPHILS NFR BLD AUTO: 3.82 10*3/MM3 (ref 1.7–7)
NEUTROPHILS NFR BLD AUTO: 68.9 % (ref 42.7–76)
NRBC BLD AUTO-RTO: 0 /100 WBC (ref 0–0.2)
PLATELET # BLD AUTO: 395 10*3/MM3 (ref 140–450)
PMV BLD AUTO: 8.9 FL (ref 6–12)
POTASSIUM SERPL-SCNC: 3.9 MMOL/L (ref 3.5–5.2)
PROT SERPL-MCNC: 7.2 G/DL (ref 6–8.5)
PROTHROMBIN TIME: 13.2 SECONDS (ref 11.9–14.6)
RBC # BLD AUTO: 4.3 10*6/MM3 (ref 4.14–5.8)
SODIUM SERPL-SCNC: 136 MMOL/L (ref 136–145)
WBC # BLD AUTO: 5.54 10*3/MM3 (ref 3.4–10.8)

## 2021-09-30 PROCEDURE — 93458 L HRT ARTERY/VENTRICLE ANGIO: CPT | Performed by: INTERNAL MEDICINE

## 2021-09-30 PROCEDURE — 25010000002 MIDAZOLAM HCL (PF) 5 MG/5ML SOLUTION: Performed by: INTERNAL MEDICINE

## 2021-09-30 PROCEDURE — 0 IOPAMIDOL PER 1 ML: Performed by: INTERNAL MEDICINE

## 2021-09-30 PROCEDURE — C1894 INTRO/SHEATH, NON-LASER: HCPCS | Performed by: INTERNAL MEDICINE

## 2021-09-30 PROCEDURE — 99152 MOD SED SAME PHYS/QHP 5/>YRS: CPT | Performed by: INTERNAL MEDICINE

## 2021-09-30 PROCEDURE — C1760 CLOSURE DEV, VASC: HCPCS | Performed by: INTERNAL MEDICINE

## 2021-09-30 PROCEDURE — 25010000002 FENTANYL CITRATE (PF) 100 MCG/2ML SOLUTION: Performed by: INTERNAL MEDICINE

## 2021-09-30 PROCEDURE — S0260 H&P FOR SURGERY: HCPCS | Performed by: INTERNAL MEDICINE

## 2021-09-30 PROCEDURE — 25010000002 HEPARIN (PORCINE) 1000-0.9 UT/500ML-% SOLUTION: Performed by: INTERNAL MEDICINE

## 2021-09-30 PROCEDURE — 25010000002 HEPARIN (PORCINE) 2000-0.9 UNIT/L-% SOLUTION: Performed by: INTERNAL MEDICINE

## 2021-09-30 PROCEDURE — 80053 COMPREHEN METABOLIC PANEL: CPT | Performed by: INTERNAL MEDICINE

## 2021-09-30 PROCEDURE — C1769 GUIDE WIRE: HCPCS | Performed by: INTERNAL MEDICINE

## 2021-09-30 PROCEDURE — 85025 COMPLETE CBC W/AUTO DIFF WBC: CPT | Performed by: INTERNAL MEDICINE

## 2021-09-30 PROCEDURE — 85610 PROTHROMBIN TIME: CPT | Performed by: INTERNAL MEDICINE

## 2021-09-30 PROCEDURE — 25010000002 DIPHENHYDRAMINE PER 50 MG: Performed by: INTERNAL MEDICINE

## 2021-09-30 RX ORDER — MIDAZOLAM HYDROCHLORIDE 1 MG/ML
INJECTION, SOLUTION INTRAMUSCULAR; INTRAVENOUS AS NEEDED
Status: DISCONTINUED | OUTPATIENT
Start: 2021-09-30 | End: 2021-09-30 | Stop reason: HOSPADM

## 2021-09-30 RX ORDER — ISOSORBIDE MONONITRATE 30 MG/1
30 TABLET, EXTENDED RELEASE ORAL DAILY
COMMUNITY

## 2021-09-30 RX ORDER — SODIUM CHLORIDE 0.9 % (FLUSH) 0.9 %
10 SYRINGE (ML) INJECTION AS NEEDED
Status: DISCONTINUED | OUTPATIENT
Start: 2021-09-30 | End: 2021-09-30 | Stop reason: HOSPADM

## 2021-09-30 RX ORDER — DIPHENHYDRAMINE HYDROCHLORIDE 50 MG/ML
INJECTION INTRAMUSCULAR; INTRAVENOUS AS NEEDED
Status: DISCONTINUED | OUTPATIENT
Start: 2021-09-30 | End: 2021-09-30 | Stop reason: HOSPADM

## 2021-09-30 RX ORDER — ASPIRIN 81 MG/1
TABLET, CHEWABLE ORAL AS NEEDED
Status: DISCONTINUED | OUTPATIENT
Start: 2021-09-30 | End: 2021-09-30 | Stop reason: HOSPADM

## 2021-09-30 RX ORDER — SODIUM CHLORIDE 9 MG/ML
75 INJECTION, SOLUTION INTRAVENOUS CONTINUOUS
Status: DISCONTINUED | OUTPATIENT
Start: 2021-09-30 | End: 2021-09-30 | Stop reason: HOSPADM

## 2021-09-30 RX ORDER — SODIUM CHLORIDE 9 MG/ML
80 INJECTION, SOLUTION INTRAVENOUS CONTINUOUS
Status: CANCELLED | OUTPATIENT
Start: 2021-09-30

## 2021-09-30 RX ORDER — HEPARIN SODIUM 200 [USP'U]/100ML
INJECTION, SOLUTION INTRAVENOUS AS NEEDED
Status: DISCONTINUED | OUTPATIENT
Start: 2021-09-30 | End: 2021-09-30 | Stop reason: HOSPADM

## 2021-09-30 RX ORDER — SODIUM CHLORIDE 0.9 % (FLUSH) 0.9 %
3 SYRINGE (ML) INJECTION EVERY 12 HOURS SCHEDULED
Status: DISCONTINUED | OUTPATIENT
Start: 2021-09-30 | End: 2021-09-30 | Stop reason: HOSPADM

## 2021-09-30 RX ORDER — FENTANYL CITRATE 50 UG/ML
INJECTION, SOLUTION INTRAMUSCULAR; INTRAVENOUS AS NEEDED
Status: DISCONTINUED | OUTPATIENT
Start: 2021-09-30 | End: 2021-09-30 | Stop reason: HOSPADM

## 2021-09-30 RX ORDER — ACETAMINOPHEN 325 MG/1
650 TABLET ORAL EVERY 4 HOURS PRN
Status: CANCELLED | OUTPATIENT
Start: 2021-09-30

## 2022-02-01 ENCOUNTER — OFFICE VISIT (OUTPATIENT)
Dept: OTOLARYNGOLOGY | Facility: CLINIC | Age: 72
End: 2022-02-01

## 2022-02-01 VITALS — WEIGHT: 144.8 LBS | HEIGHT: 72 IN | BODY MASS INDEX: 19.61 KG/M2 | TEMPERATURE: 97.8 F

## 2022-02-01 DIAGNOSIS — H60.63 CHRONIC NON-INFECTIVE OTITIS EXTERNA OF BOTH EARS, UNSPECIFIED TYPE: Primary | ICD-10-CM

## 2022-02-01 PROCEDURE — 99213 OFFICE O/P EST LOW 20 MIN: CPT | Performed by: OTOLARYNGOLOGY

## 2022-02-01 RX ORDER — ROSUVASTATIN CALCIUM 20 MG/1
20 TABLET, COATED ORAL DAILY
COMMUNITY

## 2022-02-01 NOTE — PROGRESS NOTES
Subjective   Kane Baldwin is a 71 y.o. male.       History of Present Illness   Patient is followed with chronic noninfective otitis externa. Has a vigorous cough reflex when his ears are clean. Says he is not having any otorrhea or bleeding      The following portions of the patient's history were reviewed and updated as appropriate: allergies, current medications, past family history, past medical history, past social history, past surgical history and problem list.     reports that he has quit smoking. His smoking use included cigarettes. He has a 10.00 pack-year smoking history. His smokeless tobacco use includes chew. He reports that he does not drink alcohol and does not use drugs.   Patient is not a tobacco user and has not been counseled for use of tobacco products      Review of Systems        Objective   Physical Exam  Both ear canals show uninfected squamous debris that is cleaned under the microscope using instrumentation. Tympanic membrane's are intact no infection or effusion. There is some mild bleeding on the right so Ciprodex is instilled.      Assessment/Plan   Diagnoses and all orders for this visit:    1. Chronic non-infective otitis externa of both ears, unspecified type (Primary)      Plan: Ears cleaned as described above. Advise return 4 months. Call for problems.

## 2022-06-07 ENCOUNTER — OFFICE VISIT (OUTPATIENT)
Dept: OTOLARYNGOLOGY | Facility: CLINIC | Age: 72
End: 2022-06-07

## 2022-06-07 VITALS — BODY MASS INDEX: 19.38 KG/M2 | HEIGHT: 72 IN | WEIGHT: 143.1 LBS | TEMPERATURE: 98.1 F

## 2022-06-07 DIAGNOSIS — H60.63 CHRONIC NON-INFECTIVE OTITIS EXTERNA OF BOTH EARS, UNSPECIFIED TYPE: Primary | ICD-10-CM

## 2022-06-07 PROCEDURE — 99213 OFFICE O/P EST LOW 20 MIN: CPT | Performed by: OTOLARYNGOLOGY

## 2022-06-07 NOTE — PROGRESS NOTES
Subjective   Kane Baldwin is a 72 y.o. male.       History of Present Illness     Patient with longstanding chronic noninfective otitis externa, dilation of the ear canals particularly on the left, a vigorous cough reflex with stimulation of his ear canals states his ears have not been draining but feels stopped up particularly on the left    The following portions of the patient's history were reviewed and updated as appropriate: allergies, current medications, past family history, past medical history, past social history, past surgical history and problem list.     reports that he has quit smoking. His smoking use included cigarettes. He has a 10.00 pack-year smoking history. His smokeless tobacco use includes chew. He reports that he does not drink alcohol and does not use drugs.   Patient is not a tobacco user and has not been counseled for use of tobacco products      Review of Systems        Objective   Physical Exam  Both ear canals show a large amount of uninfected squamous debris.  This is cleaned under the microscope using instrumentation.  No bleeding, granulation tissue, or purulence.  Tympanic membranes are intact no infection or effusion       Assessment and Plan   Diagnoses and all orders for this visit:    1. Chronic non-infective otitis externa of both ears, unspecified type (Primary)               Plan: Ears cleaned as described above.  Return 4 months.  Call for problems.

## 2022-10-04 ENCOUNTER — OFFICE VISIT (OUTPATIENT)
Dept: OTOLARYNGOLOGY | Facility: CLINIC | Age: 72
End: 2022-10-04

## 2022-10-04 VITALS — BODY MASS INDEX: 19.13 KG/M2 | WEIGHT: 141.2 LBS | HEIGHT: 72 IN

## 2022-10-04 DIAGNOSIS — H60.63 CHRONIC NON-INFECTIVE OTITIS EXTERNA OF BOTH EARS, UNSPECIFIED TYPE: Primary | ICD-10-CM

## 2022-10-04 PROCEDURE — 99213 OFFICE O/P EST LOW 20 MIN: CPT | Performed by: OTOLARYNGOLOGY

## 2022-10-07 NOTE — PROGRESS NOTES
Subjective   Kane Baldwin is a 72 y.o. male.       History of Present Illness     Patient is followed with chronic noninfective otitis externa.  Has somewhat dilated ear canals.  Has not been having any otorrhea.  The following portions of the patient's history were reviewed and updated as appropriate: allergies, current medications, past family history, past medical history, past social history, past surgical history and problem list.     reports that he has quit smoking. His smoking use included cigarettes. He has a 10.00 pack-year smoking history. His smokeless tobacco use includes chew. He reports that he does not drink alcohol and does not use drugs.   Patient is not a tobacco user and has not been counseled for use of tobacco products      Review of Systems        Objective   Physical Exam    Both ear canals show a good bit of uninfected squamous debris that is cleaned under the microscope using instrumentation.  As always the patient has a vigorous cough reflex.  No evidence of infection or granulation tissue.  Tympanic membranes are intact.       Assessment and Plan   Diagnoses and all orders for this visit:    1. Chronic non-infective otitis externa of both ears, unspecified type (Primary)           Plan: Ears cleaned as described above.  Return 4 months.  Call for problems.

## 2023-02-07 ENCOUNTER — OFFICE VISIT (OUTPATIENT)
Dept: OTOLARYNGOLOGY | Facility: CLINIC | Age: 73
End: 2023-02-07
Payer: MEDICARE

## 2023-02-07 VITALS — HEIGHT: 72 IN | OXYGEN SATURATION: 96 % | BODY MASS INDEX: 19.45 KG/M2 | WEIGHT: 143.6 LBS

## 2023-02-07 DIAGNOSIS — H60.63 CHRONIC NON-INFECTIVE OTITIS EXTERNA OF BOTH EARS, UNSPECIFIED TYPE: Primary | ICD-10-CM

## 2023-02-07 PROCEDURE — 99213 OFFICE O/P EST LOW 20 MIN: CPT | Performed by: OTOLARYNGOLOGY

## 2023-06-08 ENCOUNTER — OFFICE VISIT (OUTPATIENT)
Dept: OTOLARYNGOLOGY | Facility: CLINIC | Age: 73
End: 2023-06-08
Payer: MEDICARE

## 2023-06-08 VITALS — HEIGHT: 72 IN | OXYGEN SATURATION: 99 % | BODY MASS INDEX: 19.37 KG/M2 | WEIGHT: 143 LBS

## 2023-06-08 DIAGNOSIS — H60.63 CHRONIC NON-INFECTIVE OTITIS EXTERNA OF BOTH EARS, UNSPECIFIED TYPE: Primary | ICD-10-CM

## 2023-06-08 PROCEDURE — 1160F RVW MEDS BY RX/DR IN RCRD: CPT | Performed by: OTOLARYNGOLOGY

## 2023-06-08 PROCEDURE — 1159F MED LIST DOCD IN RCRD: CPT | Performed by: OTOLARYNGOLOGY

## 2023-06-08 PROCEDURE — 99213 OFFICE O/P EST LOW 20 MIN: CPT | Performed by: OTOLARYNGOLOGY

## 2023-06-08 NOTE — PROGRESS NOTES
Subjective   Kane Baldwin is a 73 y.o. male.       History of Present Illness   Patient is followed with chronic otitis externa, dilated ear canal particularly on the left, and extremely sensitive cough reflex with manipulation of his ears.  He reports no otorrhea.      The following portions of the patient's history were reviewed and updated as appropriate: allergies, current medications, past family history, past medical history, past social history, past surgical history and problem list.     reports that he has quit smoking. His smoking use included cigarettes. He has a 10.00 pack-year smoking history. His smokeless tobacco use includes chew. He reports that he does not drink alcohol and does not use drugs.   Patient is not a tobacco user and has not been counseled for use of tobacco products      Review of Systems        Objective   Physical Exam  Both ear canals show uninfected squamous debris that is cleaned under the microscope using instrumentation.  No granulation tissue or purulence.  Tympanic membranes are intact         Assessment and Plan   Diagnoses and all orders for this visit:    1. Chronic non-infective otitis externa of both ears, unspecified type (Primary)             Plan: Ears cleaned as described above.  Return 4 months.  Call for problems.

## (undated) DEVICE — CANN CO2/O2 NASL A/

## (undated) DEVICE — CATH F5 INF JL 5 100CM: Brand: INFINITI

## (undated) DEVICE — SOLIDIFIER LIQUI LOC PLUS 2000CC

## (undated) DEVICE — MODEL BT2000 P/N 700287-012KIT CONTENTS: MANIFOLD WITH SALINE AND CONTRAST PORTS, SALINE TUBING WITH SPIKE AND HAND SYRINGE, TRANSDUCER: Brand: BT2000 AUTOMATED MANIFOLD KIT

## (undated) DEVICE — MODEL AT P65, P/N 701554-001KIT CONTENTS: HAND CONTROLLER, 3-WAY HIGH-PRESSURE STOPCOCK WITH ROTATING END AND PREMIUM HIGH-PRESSURE TUBING: Brand: ANGIOTOUCH® KIT

## (undated) DEVICE — FR5 INFINITI MULTIPAC: Brand: INFINITI

## (undated) DEVICE — PAD, DEFIB, ADULT, RADIOTRANS, PHILIPS: Brand: MEDLINE

## (undated) DEVICE — PINNACLE INTRODUCER SHEATH: Brand: PINNACLE

## (undated) DEVICE — PK CATH CARD 30 CA/4

## (undated) DEVICE — PERCLOSE PROGLIDE™ SUTURE-MEDIATED CLOSURE SYSTEM: Brand: PERCLOSE PROGLIDE™

## (undated) DEVICE — GW STARTER FXD CORE J .035 3X150CM 3MM

## (undated) DEVICE — SOL IRR NACL 0.9PCT BT 1000ML